# Patient Record
Sex: MALE | Race: WHITE | NOT HISPANIC OR LATINO | Employment: UNEMPLOYED | URBAN - METROPOLITAN AREA
[De-identification: names, ages, dates, MRNs, and addresses within clinical notes are randomized per-mention and may not be internally consistent; named-entity substitution may affect disease eponyms.]

---

## 2023-01-01 ENCOUNTER — HOSPITAL ENCOUNTER (EMERGENCY)
Facility: HOSPITAL | Age: 23
Discharge: HOME | End: 2023-01-01
Attending: EMERGENCY MEDICINE | Admitting: EMERGENCY MEDICINE
Payer: COMMERCIAL

## 2023-01-01 VITALS
HEART RATE: 101 BPM | OXYGEN SATURATION: 97 % | SYSTOLIC BLOOD PRESSURE: 138 MMHG | HEIGHT: 69 IN | BODY MASS INDEX: 29.62 KG/M2 | DIASTOLIC BLOOD PRESSURE: 80 MMHG | TEMPERATURE: 98.7 F | RESPIRATION RATE: 19 BRPM | WEIGHT: 200 LBS

## 2023-01-01 DIAGNOSIS — Z02.83 ENCOUNTER FOR BLOOD-DRUG TEST: ICD-10-CM

## 2023-01-01 PROBLEM — R41.0: Status: ACTIVE | Noted: 2019-04-15

## 2023-01-01 PROBLEM — T43.205A: Status: ACTIVE | Noted: 2019-04-15

## 2023-01-01 PROBLEM — F41.9 ANXIETY: Status: ACTIVE | Noted: 2017-01-18

## 2023-01-01 PROBLEM — H66.90 OTITIS MEDIA: Status: ACTIVE | Noted: 2023-01-01

## 2023-01-01 PROBLEM — F29 PSYCHOSIS (H): Status: ACTIVE | Noted: 2023-01-01

## 2023-01-01 PROBLEM — D64.9 ANEMIA, UNSPECIFIED: Status: ACTIVE | Noted: 2023-01-01

## 2023-01-01 PROBLEM — F19.921: Status: ACTIVE | Noted: 2019-04-12

## 2023-01-01 PROBLEM — E87.6 HYPOKALEMIA: Status: ACTIVE | Noted: 2023-01-01

## 2023-01-01 LAB
AMPHETAMINES UR QL SCN: NORMAL
BARBITURATES UR QL SCN: NORMAL
BENZODIAZ UR QL SCN: NORMAL
BZE UR QL SCN: NORMAL
CANNABINOIDS UR QL SCN: NORMAL
OPIATES UR QL SCN: NORMAL
PCP QUAL URINE (ROCHE): NORMAL

## 2023-01-01 PROCEDURE — 80307 DRUG TEST PRSMV CHEM ANLYZR: CPT | Performed by: EMERGENCY MEDICINE

## 2023-01-01 PROCEDURE — 99283 EMERGENCY DEPT VISIT LOW MDM: CPT

## 2023-01-01 ASSESSMENT — ACTIVITIES OF DAILY LIVING (ADL): ADLS_ACUITY_SCORE: 35

## 2023-01-01 NOTE — ED TRIAGE NOTES
patient states that his [facility] house did drug testing and he came up positive for oxycodone. Patient states he absolutely did not take oxycodone and wants a repeat test.

## 2023-01-01 NOTE — ED PROVIDER NOTES
EMERGENCY DEPARTMENT ENCOUNTER            IMPRESSION:  Evaluation for drug testing -no opiates identified on drug screen      MEDICAL DECISION MAKING:  Patient referred from [facility] for drug testing.  He reports to positive drug test that the [facility].  He denies substance abuse    On exam he is awake and alert.  Slightly tachycardic.    Urinalysis is negative for any recreational substances    Patient given copy of the report and discharged home      =================================================================  CHIEF COMPLAINT:  Chief Complaint   Patient presents with     Drug / Alcohol Assessment         HPI  Ángel Burgos is a 22 year old male with a history of psychosis, substance abuse, depression, drug intoxication with delirium, suicidal ideation, ADHD, headache, anemia, and serotonin withdrawal syndrome who presents to the ED by walk in escorted by father for evaluation of drug assessment.    Per patient, he is currently living in a sober house in [place]. Today, they did a urine drug test, and his came back positive for oxycodone. They repeated the test, and it came back positive again. Patient states he absolutely did no take oxycodone and wants a repeat test.    Per patient's dad, he notes that the patient had some dental fillings done earlier in the week and wonders if that could be the cause of the positive result.    Patient states he was sent to the ED to get another urine test.    Patient is otherwise healthy and denies any other complaints at this time.    I, Thuy Larson am serving as a scribe to document services personally performed by Dr. Karan Rutledge MD, based on my observation and the provider's statements to me. I, Dr. Karan Rutledge MD attest that Thuy Larson is acting in a scribe capacity, has observed my performance of the services and has documented them in accordance with my direction.      REVIEW OF SYSTEMS   Denies drug use      PAST MEDICAL  "HISTORY:  No past medical history on file.    PAST SURGICAL HISTORY:  No past surgical history on file.      CURRENT MEDICATIONS:    amphetamine-dextroamphetamine (ADDERALL XR) 25 MG 24 hr capsule  SUMAtriptan (IMITREX) 25 MG tablet        ALLERGIES:  No Known Allergies    FAMILY HISTORY:  No family history on file.    SOCIAL HISTORY:   Social History     Socioeconomic History     Marital status: Single   Tobacco Use     Smoking status: Never   Substance and Sexual Activity     Alcohol use: No     Drug use: No     Sexual activity: Never       PHYSICAL EXAM:    /80   Pulse 101   Temp 98.7  F (37.1  C)   Resp 19   Ht 1.753 m (5' 9\")   Wt 90.7 kg (200 lb)   SpO2 97%   BMI 29.53 kg/m      Constitutional: Awake, alert, in no acute distress  Psychiatric: Normal mood and affect. Normal judgement.    ED COURSE:    5:45 PM I introduced myself to the patient, obtained patient history, performed a physical exam, and discussed plan for ED workup including potential diagnostic laboratory/imaging studies and interventions.    7:28 PM Rechecked and updated the patient. Drug test negative for oxycodone. We discussed the plan for discharge and the patient is agreeable. Reviewed supportive cares, symptomatic treatment, outpatient follow up, and reasons to return to the Emergency Department. Patient to be discharged by ED RN.         Medical Decision Making    History:    Supplemental history from: Documented in HPI, if applicable    External Record(s) reviewed: Documented in [facility], if applicable.    Work Up:    Chart documentation includes differential considered and any EKGs or imaging independently interpreted by provider.    In additional to work up documented, I considered the following work up:     External consultation:    Discussion of management with another provider:     Complicating factors:    Care impacted by chronic illness:     Care affected by social determinants of health:     Disposition considerations:    "       LAB:  Laboratory results were independently reviewed and interpreted  Results for orders placed or performed during the hospital encounter of 01/01/23   Drug abuse screen 77 urine (FL, RH, SH)   Result Value Ref Range    Amphetamines Urine Screen Negative Screen Negative    Barbituates Urine Screen Negative Screen Negative    Benzodiazepine Urine Screen Negative Screen Negative    Cannabinoids Urine Screen Negative Screen Negative    Opiates Urine Screen Negative Screen Negative    PCP Urine Screen Negative Screen Negative    Cocaine Urine Screen Negative Screen Negative             NEW PRESCRIPTIONS STARTED AT TODAY'S ER VISIT:  New Prescriptions    No medications on file          FINAL DIAGNOSIS:    ICD-10-CM    1. Encounter for blood-drug test  Z02.83                At the conclusion of the encounter I discussed the results of all of the tests and the disposition. The questions were answered. The patient or family acknowledged understanding and was agreeable with the care plan.     NAME: Ángel Burgos  AGE: 22 year old male  YOB: 2000  MRN: [id number]  EVALUATION DATE & TIME: 1/1/2023  5:14 PM    PCP: Radha Juares    ED PROVIDER: Karan Rutledge M.D.      I, Thuy Larson, am serving as a scribe to document services personally performed by Dr. Karan Rutledge based on my observation and the provider's statements to me. I, Karan Rutledge MD attest that Thuy Larson is acting in a scribe capacity, has observed my performance of the services and has documented them in accordance with my direction.    Karan Rutledge M.D.  Emergency Medicine  [facility] EMERGENCY DEPARTMENT  [facility] [facility] [address]  [phone]  Dept: [phone]  1/1/2023       Karan Rutledge MD  01/01/23 [id number]

## 2023-02-15 ENCOUNTER — OFFICE VISIT (OUTPATIENT)
Dept: SLEEP MEDICINE | Facility: CLINIC | Age: 23
End: 2023-02-15
Payer: COMMERCIAL

## 2023-02-15 VITALS
HEIGHT: 69 IN | WEIGHT: 216 LBS | DIASTOLIC BLOOD PRESSURE: 85 MMHG | RESPIRATION RATE: 16 BRPM | SYSTOLIC BLOOD PRESSURE: 135 MMHG | BODY MASS INDEX: 31.99 KG/M2 | OXYGEN SATURATION: 96 % | HEART RATE: 102 BPM

## 2023-02-15 DIAGNOSIS — R06.83 PRIMARY SNORING: ICD-10-CM

## 2023-02-15 DIAGNOSIS — F51.05 INSOMNIA DUE TO OTHER MENTAL DISORDER: Primary | ICD-10-CM

## 2023-02-15 DIAGNOSIS — F99 INSOMNIA DUE TO OTHER MENTAL DISORDER: Primary | ICD-10-CM

## 2023-02-15 PROCEDURE — 99204 OFFICE O/P NEW MOD 45 MIN: CPT | Performed by: STUDENT IN AN ORGANIZED HEALTH CARE EDUCATION/TRAINING PROGRAM

## 2023-02-15 RX ORDER — OLANZAPINE 10 MG/1
10 TABLET ORAL
COMMUNITY
Start: 2023-02-08

## 2023-02-15 ASSESSMENT — SLEEP AND FATIGUE QUESTIONNAIRES
HOW LIKELY ARE YOU TO NOD OFF OR FALL ASLEEP WHILE SITTING INACTIVE IN A PUBLIC PLACE: SLIGHT CHANCE OF DOZING
HOW LIKELY ARE YOU TO NOD OFF OR FALL ASLEEP WHILE SITTING QUIETLY AFTER LUNCH WITHOUT ALCOHOL: SLIGHT CHANCE OF DOZING
HOW LIKELY ARE YOU TO NOD OFF OR FALL ASLEEP WHILE SITTING AND READING: SLIGHT CHANCE OF DOZING
HOW LIKELY ARE YOU TO NOD OFF OR FALL ASLEEP WHILE SITTING AND TALKING TO SOMEONE: WOULD NEVER DOZE
HOW LIKELY ARE YOU TO NOD OFF OR FALL ASLEEP IN A CAR, WHILE STOPPED FOR A FEW MINUTES IN TRAFFIC: WOULD NEVER DOZE
HOW LIKELY ARE YOU TO NOD OFF OR FALL ASLEEP WHILE WATCHING TV: SLIGHT CHANCE OF DOZING
HOW LIKELY ARE YOU TO NOD OFF OR FALL ASLEEP WHEN YOU ARE A PASSENGER IN A CAR FOR AN HOUR WITHOUT A BREAK: WOULD NEVER DOZE
HOW LIKELY ARE YOU TO NOD OFF OR FALL ASLEEP WHILE LYING DOWN TO REST IN THE AFTERNOON WHEN CIRCUMSTANCES PERMIT: SLIGHT CHANCE OF DOZING

## 2023-02-15 NOTE — PATIENT INSTRUCTIONS
"MY INFORMATION ON SLEEP APNEA-  Ángel Burgos    DOCTOR : Bassam Jerez MD  SLEEP CENTER :      MY CONTACT NUMBER:    [facility]  [phone]  [facility]      Key Points:  1. What is Obstructive Sleep Apnea (ALICE)? ALICE is the most common type of sleep apnea. Apnea literally means, \"without breath.\" It is characterized by repetitive pauses in breathing, despite continued effort to breathe, and is usually associated with a reduction in blood oxygen saturation. Apneas can last 10 to over 60 seconds. It is caused by narrowing or collapse of the upper airway as muscles relax during sleep.       2. What are the consequences of ALICE? Symptoms include: daytime sleepiness- possibly increasing the risk of falling asleep while driving, unrefreshing/restless sleep, snoring, insomnia, waking frequently to urinate, waking with heartburn or reflux, reduced concentration and memory, and morning headaches. Other health consequences may include development of high blood pressure. Untreated ALICE also can contribute to heart disease, stroke and diabetes.   3. What are the treatment options? In most situations, sleep apnea is a lifelong disease that must be managed with daily therapy. Continuous Positive Airway (CPAP) is the most reliable treatment. A mouthguard to hold your jaw forward is usually the next most reliable option. Other options include postioning devices (to keep you off your back), nasal valves, tongue retaining device, weight loss, surgery. There is more detail about these options toward the end of this document.  4. What are the most important things to remember about using CPAP?     WHERE CAN I FIND MORE INFORMATION?    American Academy of Sleep Medicine Patient information on sleep disorders:  http://yoursleep.aasmnet.org    CPAP -  WHY AND HOW?                 Continuous positive airway pressure, or CPAP, is the most effective treatment for obstructive sleep " "apnea. It works by blowing room air, through a mask, to hold your throat open. A decision to use CPAP is a major step forward in the pursuit of a healthier life. The successful use of CPAP will help you breathe easier, sleep better and live healthier. Using CPAP can be a positive experience if you keep these bartlett points in mind:  Commitment  CPAP is not a quick fix for your problem. It involves a long-term commitment to improve your sleep and your health.    Communication  Stay in close communication with both your sleep doctor and your CPAP supplier. Ask lots of questions and seek help when you need it.    Consistency  Use CPAP all night, every night and for every nap. You will receive the maximum health benefits from CPAP when you use it every time that you sleep. This will also make it easier for your body to adjust to the treatment.    Correction  The first machine and mask that you try may not be the best ones for you. Work with your sleep doctor and your CPAP supplier to make corrections to your equipment selection. Ask about trying a different type of machine or mask if you have ongoing problems. Make sure that your mask is a good fit and learn to use your equipment properly.    Challenge  Tell a family member or close friend to ask you each morning if you used your CPAP the previous night. Have someone to challenge you to give it your best effort.    Connection   Your adjustment to CPAP will be easier if you are able to connect with others who use the same treatment. Ask your sleep doctor if there is a support group in your area for people who have sleep apnea, or look for one on the Internet.  Comfort   Increase your level of comfort by using a saline spray, decongestant or heated humidifier if CPAP irritates your nose, mouth or throat. Use your unit's \"ramp\" setting to slowly get used to the air pressure level. There may be soft pads you can buy that will fit over your mask straps. Look on www.CPAP.com for " accessories that can help make CPAP use more comfortable.  Cleaning   Clean your mask, tubing and headgear on a regular basis. Put this time in your schedule so that you don't forget to do it. Check and replace the filters for your CPAP unit and humidifier.    Completion   Although you are never finished with CPAP therapy, you should reward yourself by celebrating the completion of your first month of treatment. Expect this first month to be your hardest period of adjustment. It will involve some trial and error as you find the machine, mask and pressure settings that are right for you.    Continuation  After your first month of treatment, continue to make a daily commitment to use your CPAP all night, every night and for every nap.    CPAP-Tips to starting with success:  Begin using your CPAP for short periods of time during the day while you watch TV or read.    Use CPAP every night and for every nap. Using it less often reduces the health benefits and makes it harder for your body to get used to it.    Newer CPAP models are virtually silent; however, if you find the sound of your CPAP machine to be bothersome, place the unit under your bed to dampen the sound.     Make small adjustments to your mask, tubing, straps and headgear until you get the right fit. Tightening the mask may actually worsen the leak.  If it leaves significant marks on your face or irritates the bridge of your nose, it may not be the best mask for you.  Speak with the person who supplied the mask and consider trying other masks. Insurances will allow you to try different masks during the first month of starting CPAP.  Insurance also covers a new mask, hose and filter about every 6 months.    Use a saline nasal spray to ease mild nasal congestion. Neti-Pot or saline nasal rinses may also help. Nasal gel sprays can help reduce nasal dryness.  Biotene mouthwash can be helpful to protect your teeth if you experience frequent dry mouth.  Dry mouth  may be a sign of air escaping out of your mouth or out of the mask in the case of a full face mask.  Speak with your provider if you expect that is the case.     Take a nasal decongestant to relieve more severe nasal or sinus congestion.  Do not use Afrin (oxymetazoline) nasal spray more than 3 days in a row.  Speak with your sleep doctor if your nasal congestion is chronic.    Use a heated humidifier that fits your CPAP model to enhance your breathing comfort. Adjust the heat setting up if you get a dry nose or throat, down if you get condensation in the hose or mask.  Position the CPAP lower than you so that any condensation in the hose drains back into the machine rather than towards the mask.    Try a system that uses nasal pillows if traditional masks give you problems.    Clean your mask, tubing and headgear once a week. Make sure the equipment dries fully.    Regularly check and replace the filters for your CPAP unit and humidifier.    Work closely with your sleep provider and your CPAP supplier to make sure that you have the machine, mask and air pressure setting that works best for you. It is better to stop using it and call your provider to solve problems than to lay awake all night frustrated with the device.    BESIDES CPAP, WHAT OTHER THERAPIES ARE THERE?  Postioning devices if you only have the snoring or apnea while on your back  Dental devices if your condition is mild  Nasal valves may be effective though experience is limited  Weight loss if you are overweight  Surgery in limited cases where devices are not acceptable or there are problems with structures in the nose and throat  If treated with one of these alternative options, further evaluation is necessary to ensure that the therapy is effective. This may require some form of repeat testing.      Healthy Lifestyle:  Healthy diet, exercise and limit alcohol: Not only will excessive alcohol increase your weight over time, but it irritates the  throat tissues and make them swell, shrinking the airway and causing snoring. Drinking alcohol should be limited and stopped within 3-4 hours before going to bed.   Stop smoking: (Red swollen throat, heat, nicotine), also irritates and swells the airway, among numerous other negative health consequences.    Positioning Device  This example shows a pillow that straps around the waist. It may be appropriate for those whose sleep study shows milder sleep apnea that occurs primarily when lying flat on one's back. Preliminary studies have shown benefit but effectiveness at home should be verified.                      Oral Appliance  These are examples of two of many custom-made devices that are more likely to work in mild sleep apnea   Oral appliances are dental mouth pieces that fit very much like a sports mouth guards or removable orthodontic retainers. They are used to treat snoring and obstructive sleep apnea . The device prevents the airway from collapsing by either supporting the jaw in a forward position. Since oral appliances are non-invasive and easy to use, they may be considered as an early treatment option. Oral appliance therapy (OAT) involves the customization, selection, fabrication, fitting, adjustments and long-term follow-up care.  Custom made oral appliances are proven to be more effective than over-the-counter devices. Therefore, the over-the-counter devices are recommended not to be used as a screening tool nor as a therapeutic option.     Who gets a dental device?  Oral appliance therapy can be used as an alternative to CPAP therapy for the treatment of mild to moderate sleep apnea and for those patients who prefer OAT to CPAP. Oral appliance therapy is a first line therapy for the treatment of primary snoring. Additionally, OAT is an option for those that cannot tolerate CPAP as therapy or who have experienced insufficient surgical results.                 Possible side effects?  Frequent but  minor side effects include: excessive salivation, dry mouth, discomfort of teeth and jaw and temporary changes in the patient s bite.  Potential complications include: jaw pain, permanent occlusal changes and TMJ symptoms.  The above mentioned side effects and complications can be recognized and managed by dentists trained in dental sleep medicine.   Finding a dentist that practices dental sleep medicine  Specific training is available through the American Academy of Dental Sleep Medicine for dentists interested in working in the field of sleep. To find a dentist who is educated in the field of sleep and the use of oral appliances, near you, visit the Web site of the American Academy of Dental Sleep Medicine; also see http://www.accpstorage.org/newOrganization/patients/oralAppliances.pdf   To search for a dentist certified in these practices:  Http://aadsm.org/FindADentist.aspx?1  Nasal Valves              Nasal valves may be an option for mild apnea if other options are not well tolerated. The efficacy of these devices is generally less than CPAP or oral appliances.They may not be effective if you have frequent nasal congestion or have difficulty breathing through your nose.   Weight Loss:    Weight loss decreases severity of sleep apnea in most people with obesity. For those with mild obesity who have developed snoring with weight gain, even 15-30 pound weight loss can improve and occasionally eliminate sleep apnea.  Structured and life-long dietary and health habits are necessary to lose weight and keep healthier weight levels.     Though there are significant health benefits from weight loss, long-term weight loss is very difficult to achieve- studies show success with dietary management in less than 10% of people. In addition, substantial weight loss may require years of dietary control and may be difficult if patients have severe obesity. In these cases, surgical management may be considered.    If you are  interested in methods for weight loss, you should review the options discussed at the National Institutes of Health patient information sites:     http://win.niddk.nih.gov/publications/index.htm  http:/www.health.nih.gov/topic/WeightLossDieting    Bariatric programs offer counseling in all methods of weight loss:    Http:/[email or web address]/htm    Your BMI is Body mass index is 31.9 kg/m .        Body mass index (BMI) is one way to tell whether you are at a healthy weight, overweight, or obese. It measures your weight in relation to your height.  A BMI of 18.5 to 24.9 is in the healthy range. A person with a BMI of 25 to 29.9 is considered overweight, and someone with a BMI of 30 or greater is considered obese.  Another way to find out if you are at risk for health problems caused by overweight and obesity is to measure your waist. If you are a woman and your waist is more than 35 inches, or if you are a man and your waist is more than 40 inches, your risk of disease may be higher.  More than two-thirds of American adults are considered overweight or obese. Being overweight or obese increases the risk for further weight gain.  Excess weight may lead to heart disease and diabetes. Creating and following plans for healthy eating and physical activity may help you improve your health.    Methods for maintaining or losing weight.    Weight control is part of healthy lifestyle and includes exercise, emotional health, and healthy eating habits.  Careful eating habits lifelong is the mainstay of weight control.  Though there are significant health benefits from weight loss, long-term weight loss with diet alone may be very difficult to achieve- studies show long-term success with dietary management in less than 10% of people. Attaining a healthy weight may be especially difficult to achieve in those with severe obesity. In some cases, medications, devices and surgical  "management might be considered.    What can you do?    If you are overweight or obese and are interested in methods for weight loss, you should discuss this with your provider. In addition, we recommend that you review healthy life styles and methods for weight loss available through the National Institutes of Health patient information sites:     http://win.niddk.nih.gov/publications/index.htm      Surgery:  There are a number of surgeries that have been attempted to treat apnea. In general, surgical options are usually reserved for cases in which there is a physical abnormality contributing to obstruction or other treatment options are ineffective or not tolerated. Most surgical options are either unreliable or quite invasive. One of the more common procedures is:  Uvulopalatopharyngoplasty: In this procedure, the uvula (the finger-like tissue that hangs in the back of the throat), part of the soft palate (the tissue that the uvula is attached to), and sometimes the tonsils or adenoids are removed. The efficacy of this surgery is around 30-50% .  After surgery, complications may include:  Sleepiness and sleep apnea related to post-surgery medication   Swelling, infection and bleeding   A sore throat and/or difficulty swallowing   Drainage of secretions into the nose and a nasal quality to the voice. English language speech does not seem to be affected by this surgery.   Narrowing of the airway in the nose and throat (hence constricting breathing) snoring and even iatrogenically caused sleep apnea. By cutting the tissues, excess scar tissue can \"tighten\" the airway and make it even smaller than it was before UPPP.  Patients who have had the uvula removed will become unable to correctly speak Sami or any other language that has a uvular 'r' phoneme.  Surgeries to help resolve nasal congestion may help reduce the severity of apnea slightly. Nasal congestion does not cause apnea on its own, so these surgeries are " usually not performed just for ALICE.  They may be worth considering if the nasal congestion is significantly bothersome independent of apnea.           CBT-I Introductory Module    Understanding Sleep and Insomnia    Most people have trouble sleeping at some point in their life.   Chronic insomnia means you have had trouble falling asleep and/or staying asleep for at least the past three months.  Despite allowing enough time for sleep, it is affecting how you feel. You are not alone.  It is estimated that 10-15% of adults experience chronic insomnia.     Cognitive Behavioral Therapy for Insomnia (CBT-I)    Consistent with the guidelines of the American College of Physicians, [facility] recommends  Cognitive Behavioral Therapy for Insomnia (CBT-I) as the first-line treatment for insomnia.  CBT-I targets factors that lead to long-term insomnia:    Behavioral Conditioning    When you lay awake in bed over many nights, your body actually becomes trained or 'conditioned' to be awake during the night.  It makes the bed associated with alertness instead of sleepiness.    Habits that weaken your body's sleep drive and circadian sleep rhythm    Changing sleep schedules, extended time in bed, using mobile devices and computers close to bedtime, and naps too late in the day can harm your sleep at night.    Emotional and Physical Arousal    Things such as worrying about sleep, stress, drinking too much caffeine, and not winding down before bed can interfere with your body's natural sleep drive.    Understanding Sleep and Insomnia    [facility] CBT-I is a four-part program that teaches you the skills needed for a better night's sleep. The first step in your program is learning a bit about sleep and insomnia.      The Basics of Sleep    How does sleep help us?    Sleep, like food and water, is something we need every day. The purpose of sleep is still not exactly clear, but sleep experts agree we need consistent  quality sleep to function at our best. There is evidence that sleep helps maintain brain and body functions.  It helps maintain thinking ability and mood.  Sleep is actually a very active part of life. Sleep occurs in four stages that cycle every  minutes throughout the night. We get our deepest sleep during the first few hours of sleep.  During the last half of our sleep, we usually get the bulk of our REM (Rapid Eye Movement) sleep.  REM sleep is when most of our dreaming occurs.    How much sleep do we need?    Sleep needs vary from person to person. Most adults need between 6-8 hours of sleep.  Sleeping too little or too much may be a health risk.  As we age, most people report their sleep gets lighter, earlier, shorter, and more restless.     What Controls Our Sleep?    The three things that regulate your sleep are your sleep drive, biological clock and your arousal system (emotional and physical).  Together these make us feel alert during the day and promote sleep at night.    Your sleep drive depends on how long you have been awake. It is lowest when you first wake up.  Sleep drive gradually increases as the day goes on.  The longer time you are awake the easier it is to fall asleep.  Sleeping gradually reduces your sleep drive. That is why napping in the evening or close to bed can make it harder to sleep at night.    Your biological clock promotes wakefulness during the day and sleep at night.          From Telma et al. (2009). Evaluation and Management of Insomnia in the Psychiatric setting. Published Online: 19/1/2009; DOI: https://doi.[email or web address].[email or web address]      Understanding Insomnia    What causes insomnia?    Some people have a greater predisposition to developing insomnia due to genetics, personality or age. A host of things can trigger or precipitate it: jet lag, working a different shift, medication, or the onset of a medical or mental health condition.         Insomnia and Your  Arousal System    Mental activity, emotions and physical symptoms can make your brain too active to sleep by masking the strength of your sleep drive. Your brain's arousal system not only triggers insomnia, it plays a role in maintaining it as well.  Common sources of arousal include:    Worry about sleep in bed  An active mind concerned about unfinished tasks  Anxiety, Stress and Depression  Pain     What perpetuates insomnia?    Short-term insomnia can become chronic when you begin to feel fearful, worried and  on guard  about sleep loss. As you spend more time in bed or try forcing yourself to sleep your bed becomes linked with wakefulness.  This is why people with insomnia commonly report feeling tired before bed but suddenly more alert when getting into bed.  This type of  conditioning  along with unhelpful sleep habits maintains the insomnia even when the triggering event has resolved.    Tracking your Sleep    Sleep tracking is an essential part of training yourself to sleep better and monitoring your progress.  There are several ways to keep track of your sleep habits.     Insomnia  Roberta    The Insomnia  roberta is a convenient way to keep track of your sleep prior to and during treatment.  Simply download the free roberta on your Apple or Android phone and record your information each morning.   The data you enter should be your recollection of the past nigh of sleep. Do not watch or monitor the clock in the middle of the night while keeping your sleep diary.     The roberta also includes training and sleep schedule recommendations.  We recommend you use only the tracking function unless instructed by your provider. You can email your data to yourself prior to your visit by using the [place] User Data function found in the Settings Section.  It is important that you have your data available to review with your provider at the time of your visit.          Another roberta you can try is the CBT-I  Roberta       M  [facility] Sleep Diary    You can also track your sleep using the [facility] paper sleep diary.  You can upload your sleep diary and send it via a [employer] message  or have it with you at the time of your visit.        Mobile and Wearable Sleep Tracking Devices    There are many sleep tracking devices and mobile applications that estimate the timing and quantity of sleep by measuring body movement.  Though many of these devices claim to measure the depth or stages of sleep, they cannot measure brain wave activity or other indicators required to determine the actual stages of sleep.  They are helpful in estimating the timing and total amount of time you sleep.    CBT-I and Sleeping Pills    Sleep medications can be helpful in the short-term but often stop working in the long-term.  Sleeping pills treat symptoms and not the underlying cause of insomnia.  They also can have side effects that last well into the day. Abruptly stopping sleeping pills can cause temporary rebound insomnia and lead to increased distress about sleeplessness.  This in turn strengthens the belief that pills are necessary for sleep.    Many patients choose to discontinue sleeping pills prior to beginning CBT-I.  You should talk to your prescribing provider before tapering or discontinuing sleep medication.      work on a pre-bedtime ritual,   refrain from clock watching,   and use a thought stopping technique.   get out of bed if unable to initiate sleep within 20-30 minutes at any time of the night   leave the bedroom   engage in a relaxing/distracting activity, such as reading, watching TV, listening to music, crossword puzzles, etc.    go back to bed ONLY when drowsy  Repeat these steps as necessary throughout the night but keep the same wake up time regardless of how much sleep you obtained.

## 2023-02-15 NOTE — ASSESSMENT & PLAN NOTE
STOP BANG score is 3/8. Patient is at an intermediate risk for sleep apnea based on clinical history of snoring, neck circumference, and gender.   Obstructive sleep apnea reviewed. Complications of Untreated Sleep Apnea Reviewed.  HST/ Polysomnography reviewed. Information provided regarding treatment options for ALICE.    Do not recommend further evaluation for obstructive sleep apnea at this time

## 2023-02-15 NOTE — PROGRESS NOTES
[facility]  Consultation Note    Name: Ángel Burgos MRN#: [id number]   Age: 22 year old YOB: 2000     Date of Consultation: 02/15/23  Consultation is requested by: No ref. provider found  Primary care provider: LE POWELL    History of Present Illness:   Ángel Burgos is a 22 year old male patient with MDD, possible bipolar disorder, JACKIE, ADHD on Adderall, migraines, and substance use disorder in remission   He is sent by No ref. provider found for a sleep consultation regarding Consult (Discuss sleep apnea)    Ángel Burgos main reason for visit: I was told I had to  Patient states problem(s) started: two years ago  Ángel Burgos's goals for this visit: to find out more information    He has not had a previous sleep study.    CPAP: No    Occasional difficulty falling asleep due to intrusive thoughts, active mind, and difficulty relaxing. Believes that his symptoms worsened when he had medication non-adherence to his Zyprexa     SLEEP-WAKE SCHEDULE:   Work/School Days: Patient goes to school/work: No   Usually gets into bed at 800  Takes patient about not long to fall asleep  Has trouble falling asleep two or three nights nights per week  Wakes up in the middle of the night not often times.  Wakes up due to Uncertain  He has trouble falling back asleep two or three times a week.   It usually takes not long to get back to sleep  Patient is usually up at 7  Uses alarm: Yes    Weekends/Non-work Days/All Other Days:  Usually gets into bed at 7   Takes patient about twenty minutes to fall asleep  Patient is usually up at twenty minutes  Uses alarm: Yes    Sleep Need  Patient gets  8 hours sleep on average   Patient thinks He needs about 8 hours sleep    Ángel Burgos prefers to sleep in this position(s): Back   Patient states they do the following activities in bed: Use phone, computer, or tablet    Naps  Patient takes a purposeful nap 0 times a  week and naps are usually 1 hour in duration  He feels better after a nap: Yes  He dozes off unintentionally 0 days per week  Patient has had a driving accident or near-miss due to sleepiness/drowsiness: No      SLEEP DISRUPTIONS:  Breathing/Snoring  Patient snores:Yes  Other people complain about His snoring: Yes  Patient has been told He stops breathing in His sleep:No  He has issues with the following: Morning headaches;Stuffy nose when you wake up    Movement:  Patient gets pain, discomfort, with an urge to move:  No  It happens when He is resting:  No  It happens more at night:  No  Patient has been told Ángel Burgos kicks His legs at night:  No     Behaviors in Sleep:  Ángel Burgos has experienced the following behaviors while sleeping:    He has experienced sudden muscle weakness during the day: No     Is there anything else you would like your sleep provider to know: no    CAFFEINE AND OTHER SUBSTANCES:  Patient consumes caffeinated beverages per day:  2  Last caffeine use is usually: 4  List of any prescribed or over the counter stimulants that patient takes: excedrin for migraines  List of any prescribed or over the counter sleep medication patient takes: zyprexa  List of previous sleep medications that patient has tried: clonodine  Patient drinks alcohol to help them sleep: No  Patient drinks alcohol near bedtime: No    Family History:  Patient has a family member been diagnosed with a sleep disorder: No        SCALES:  EPWORTH SLEEPINESS SCALE    [place] Sleepiness Scale ( JEREMY Wang  [id number]<br>ESS - [place]/English - Final version - 21 Nov 07 - [facility] Research [place].) 2/15/2023   Sitting and reading Slight chance of dozing   Watching TV Slight chance of dozing   Sitting, inactive in a public place (e.g. a theatre or a meeting) Slight chance of dozing   As a passenger in a car for an hour without a break Would never doze   Lying down to rest in the afternoon when circumstances permit  Slight chance of dozing   Sitting and talking to someone Would never doze   Sitting quietly after a lunch without alcohol Slight chance of dozing   In a car, while stopped for a few minutes in traffic Would never doze   [place] Score (MC) 5   [place] Score (Sleep) 5       INSOMNIA SEVERITY INDEX (JAKI)    Insomnia Severity Index (JAKI) 2/15/2023   Difficulty falling asleep 1   Difficulty staying asleep 1   Problems waking up too early 0   How SATISFIED/DISSATISFIED are you with your CURRENT sleep pattern? 1   How NOTICEABLE to others do you think your sleep problem is in terms of impairing the quality of your life? 1   How WORRIED/DISTRESSED are you about your current sleep problem? 0   To what extent do you consider your sleep problem to INTERFERE with your daily functioning (e.g. daytime fatigue, mood, ability to function at work/daily chores, concentration, memory, mood, etc.) CURRENTLY? 1   JAKI Total Score 5   Guidelines for Scoring/Interpretation:  Total score categories:  0-7 = No clinically significant insomnia   8-14 = Subthreshold insomnia   15-21 = Clinical insomnia (moderate severity)  22-28 = Clinical insomnia (severe)  Used via courtesy of www.Wallstr.va.gov with permission from Abner Castillo PhD., [facility]      STOP BANG   ALICE Medium Risk            Total Score: 3    ALICE: Snores loudly    ALICE: Neck Circum >16 in    ALICE: Male        GAD7  JACKIE-7  9/21/2016   1. Feeling nervous, anxious, or on edge 2   2. Not being able to stop or control worrying 0   3. Worrying too much about different things 1   4. Trouble relaxing 1   5. Being so restless that it is hard to sit still 2   6. Becoming easily annoyed or irritable 1   7. Feeling afraid, as if something awful might happen 1   JACKIE-7 Total Score 8   If you checked any problems, how difficult have they made it for you to do your work, take care of things at home, or get along with other people? -         CAGE-AID  No flowsheet data  "found.    CAGE-AID reprinted with permission from the [place] Medical Journal, WALLY [place]. and YULIYA Flanagan, \"Conjoint screening questionnaires for alcohol and drug abuse\" [place] Medical Journal 94: 135-140, 1995.      PATIENT HEALTH QUESTIONNAIRE-9 (PHQ - 9)  PHQ-9 ([employer]) 9/21/2016   1.  Little interest or pleasure in doing things 1   2.  Feeling down, depressed, or hopeless 1   3.  Trouble falling or staying asleep, or sleeping too much 3   4.  Feeling tired or having little energy 1   5.  Poor appetite or overeating 2   6.  Feeling bad about yourself 1   7.  Trouble concentrating 3   8.  Moving slowly or restless 2   9.  Suicidal or self-harm thoughts 0   PHQ-9 Total Score 14   Difficulty at work, home, or with people Somewhat difficult     Developed by Eloise Maier, Ashley Lim, Trevor Perkins and colleagues, with an educational wei from [employer]. No permission required to reproduce, translate, display or distribute.      Allergies:    Allergies   Allergen Reactions     Quetiapine Other (See Comments)     Breathing issues, fast heart rate, & unable to sleep       Medications:    Current Outpatient Medications   Medication Sig Dispense Refill     OLANZapine (ZYPREXA) 10 MG tablet Take 10 mg by mouth         Problem List:  Patient Active Problem List    Diagnosis Date Noted     Insomnia due to other mental disorder 02/15/2023     Priority: Medium     Primary snoring 02/15/2023     Priority: Medium     Anemia, unspecified 01/01/2023     Priority: Medium     Hypokalemia 01/01/2023     Priority: Medium     Otitis media 01/01/2023     Priority: Medium     Psychosis (H) 01/01/2023     Priority: Medium     Formatting of this note might be different from the original.  Inpatient mental health care (5/21).       Serotonin withdrawal syndrome with delirium 04/15/2019     Priority: Medium     Drug intoxication with delirium (H) 04/12/2019     Priority: Medium     Anxiety 01/18/2017     Priority: " Medium     Major depressive disorder, recurrent episode, moderate (H) 12/31/2015     Priority: Medium     ADHD (attention deficit hyperactivity disorder) 12/16/2015     Priority: Medium     Depression 12/05/2013     Priority: Medium     Headache 01/10/2013     Priority: Medium     Formatting of this note might be different from the original.  migranes  ; Headache(784.0)       Intractable migraine 09/16/2011     Priority: Medium        Past Medical/Surgical History:  No past medical history on file.  No past surgical history on file.    Social History:  Social History     Socioeconomic History     Marital status: Single     Spouse name: Not on file     Number of children: Not on file     Years of education: Not on file     Highest education level: Not on file   Occupational History     Not on file   Tobacco Use     Smoking status: Never     Smokeless tobacco: Not on file   Substance and Sexual Activity     Alcohol use: No     Drug use: No     Sexual activity: Never   Other Topics Concern     Not on file   Social History Narrative     Not on file     Social Determinants of Health     Financial Resource Strain: Not on file   Food Insecurity: Not on file   Transportation Needs: Not on file   Physical Activity: Not on file   Stress: Not on file   Social Connections: Not on file   Intimate Partner Violence: Not on file   Housing Stability: Not on file       Family History:  No family history on file.    Review of Systems:   A complete review of systems reviewed by me is negative with the exeption of what has been mentioned in the history of present illness.  In the last TWO WEEKS have you experienced any of the following symptoms?  Fevers: No  Night Sweats: No  Weight Gain: Yes  Pain at Night: No  Double Vision: No  Changes in Vision: No  Difficulty Breathing through Nose: No  Sore Throat in Morning: No  Dry Mouth in the Morning: No  Shortness of Breath Lying Flat: No  Shortness of Breath With Activity: No  Awakening with  "Shortness of Breath: No  Increased Cough: No  Heart Racing at Night: No  Swelling in Feet or Legs: No  Diarrhea at Night: No  Heartburn at Night: No  Urinating More than Once at Night: No  Losing Control of Urine at Night: No  Joint Pains at Night: No  Headaches in Morning: No  Weakness in Arms or Legs: No  Depressed Mood: No  Anxiety: Yes     Physical Exam:   Vitals: /85   Pulse 102   Resp 16   Ht 1.753 m (5' 9\")   Wt 98 kg (216 lb)   SpO2 96%   BMI 31.90 kg/m    BMI= Body mass index is 31.9 kg/m .  Neck Cir (cm): 41 cm  GENERAL: Healthy, alert and no distress  EYES: Eyes grossly normal to inspection.  No discharge or erythema, or obvious scleral/conjunctival abnormalities.  RESP: No audible wheeze, cough, or visible cyanosis.  No visible retractions or increased work of breathing.    SKIN: Visible skin clear. No significant rash, abnormal pigmentation or lesions.  NEURO: Cranial nerves grossly intact.  Mentation and speech appropriate for age.  PSYCH: Mentation appears normal, affect normal/bright, judgement and insight intact, normal speech and appearance well-groomed.         Data: All pertinent previous laboratory data reviewed   No results for input(s): NA, POTASSIUM, CHLORIDE, CO2, ANIONGAP, GLC, BUN, CR, EDMUND in the last 77863 hours.    No results for input(s): WBC, RBC, HGB, HCT, MCV, MCH, MCHC, RDW, PLT in the last 96380 hours.    No results for input(s): PROTTOTAL, ALBUMIN, BILITOTAL, ALKPHOS, AST, ALT, BILIDIRECT in the last 50689 hours.    No results found for: TSH    Amphetamines Urine (no units)   Date Value   01/01/2023 Screen Negative     Barbituates Urine (no units)   Date Value   01/01/2023 Screen Negative     Cannabinoids Urine (no units)   Date Value   01/01/2023 Screen Negative     Cocaine Urine (no units)   Date Value   01/01/2023 Screen Negative     Opiates Urine (no units)   Date Value   01/01/2023 Screen Negative     PCP Urine (no units)   Date Value   01/01/2023 Screen Negative "       No results found for: IRONSAT, [id number], CARMINE    No results found for: PH, PHARTERIAL, PO2, [id number], SAT, PCO2, HCO3, BASEEXCESS, MARIA ELENA, BEB    @LABRCNTIPR(phv:4,pco2v:4,po2v:4,hco3v:4,rory:4,o2per:4)@    Echocardiology: No results found for this or any previous visit (from the past 4320 hour(s)).    Chest x-ray: No results found for this or any previous visit from the past 365 days.      Chest CT: No results found for this or any previous visit from the past 365 days.      PFT: Most Recent [facility]ze Pulmonary Function Testing  No results found     Assessment and Plan:     Problem List Items Addressed This Visit        Respiratory    Primary snoring     STOP BANG score is 3/8. Patient is at an intermediate risk for sleep apnea based on clinical history of snoring, neck circumference, and gender.   Obstructive sleep apnea reviewed. Complications of Untreated Sleep Apnea Reviewed.  HST/ Polysomnography reviewed. Information provided regarding treatment options for ALICE.    Do not recommend further evaluation for obstructive sleep apnea at this time              Behavioral    Insomnia due to other mental disorder - Primary     Insomnia is likely multifactorial and possibly due to mental health conditions, psychophysiological, undiagnosed ALICE, lack of appropriate stimulus control, and/or inadequate sleep hygiene.  Patient believes that initiation insomnia was exacerbated during a period of medication nonadherence.  Since resuming Zyprexa and establishing a more consistent sleep routine he denies any difficulty with falling asleep and staying asleep.  His JAKI today was 5/28.    We discussed stimulus control measures    Encouraged to follow good sleep hygiene/behavioral techniques.    Encouraged patient to continue to work with mental health providers and continue with medication adherence            Patient was strongly advised to avoid driving, operating any heavy machinery or other hazardous situations while drowsy or  sleepy.  Patient was counseled on the importance of driving while alert, to pull over if drowsy, or nap before getting into the vehicle if sleepy.      Plan is for Ángel Burgos to follow up as needed    The above note was dictated using voice recognition software. Although reviewed after completion, some word and grammatical error may remain . Please contact the author for any clarifications.    Total time spent reviewing medical records, history and physical examination, review of previous testing and interpretation as well as documentation on this date, 02/15/23: 45 minutes    Bassam Jerez MD on 2/15/2023   [facility]   [address], [address]   [address] [place] [address]. [place] [address]   Appointments: [phone]    CC: No ref. provider found

## 2023-02-15 NOTE — ASSESSMENT & PLAN NOTE
Insomnia is likely multifactorial and possibly due to mental health conditions, psychophysiological, undiagnosed ALICE, lack of appropriate stimulus control, and/or inadequate sleep hygiene.  Patient believes that initiation insomnia was exacerbated during a period of medication nonadherence.  Since resuming Zyprexa and establishing a more consistent sleep routine he denies any difficulty with falling asleep and staying asleep.  His JAKI today was 5/28.    We discussed stimulus control measures    Encouraged to follow good sleep hygiene/behavioral techniques.    Encouraged patient to continue to work with mental health providers and continue with medication adherence

## 2024-04-07 ENCOUNTER — HEALTH MAINTENANCE LETTER (OUTPATIENT)
Age: 24
End: 2024-04-07

## 2024-05-22 ENCOUNTER — VIRTUAL VISIT (OUTPATIENT)
Dept: PSYCHIATRY | Facility: CLINIC | Age: 24
End: 2024-05-22
Payer: COMMERCIAL

## 2024-05-22 DIAGNOSIS — F29 PSYCHOSIS, UNSPECIFIED PSYCHOSIS TYPE (H): Primary | ICD-10-CM

## 2024-05-22 NOTE — PROGRESS NOTES
"[facility] Clinician Phone Screen  A Part of the [facility] First Episode of Psychosis Program    Patient: Ángel Burgos (2000, 23 year old)     MRN: [id number]  Date:  5/22/24  Clinician: NATALIA Chavez     Length of Actual Contact: Start Time: 3:37; End Time: 3:40    Diagnostic Information:  Briefly, in a few sentences, what would you/the patient like to be seen for?  \"I don't know if I want to do this program. My mom scheduled it and I don't have psychosis. I had it a year ago and it was my roommate that was dealing with psychosis.\" After some information about the program, Dar stated, \"ya I do not want to do this program or this. Sorry, have a nice day.\"     Plan:  Is this patient eligible for a comprehensive assessment with First Episode of Psychosis Services? No    Dar is not interested in the program or the services associated.     NATALIA Chavez   "

## 2024-08-07 ENCOUNTER — VIRTUAL VISIT (OUTPATIENT)
Dept: PSYCHIATRY | Facility: CLINIC | Age: 24
End: 2024-08-07
Payer: COMMERCIAL

## 2024-08-07 DIAGNOSIS — F29 PSYCHOSIS, UNSPECIFIED PSYCHOSIS TYPE (H): Primary | ICD-10-CM

## 2024-08-07 NOTE — PROGRESS NOTES
[facility] Clinician Phone Screen  A Part of the [facility] First Episode of Psychosis Program    Patient: Ángel Burgos (2000, 24 year old)     MRN: [id number]  Date:  8/07/24  Clinician: NATALIA Chavez     Length of Actual Contact: Start Time: 11:07; End Time: 11:35    Use the following script to set-up intentions for this appointment:    You may have heard this when you scheduled this phone call but as a reminder, this 30 minute appointment is used to review a few questions to determine if you would be a candidate for our First Episode Psychosis Programs assessment process. Our assessment process includes 2-3 additional  appointments, spread out over several weeks. Eligibility for enrollment and our treatment recommendations will be discussed after those appointments. By the end of the phone call, I hope to determine if you/Pt is eligible for the full assessment appointment process, which we will schedule at the end of this call. This is not an intake and enrollment is not guaranteed.     We also want to be  transparent that start dates may vary depending on eligibility and program availability.   With knowing this information, do you still want to proceed with this phone screen? Yes    Reviewed limits to confidentiality: Yes    Use the following script to describe limits to confidentiality if meeting with the patient/family:   Before we get started I always like to review confidentiality. All of the information you share will be kept confidential. Only with permission will information be released to anyone outside of the organization except when required by law. Those legal exceptions are if there is clear and imminent danger to you or someone else, if there is a reasonable suspicion that child or elder is being abused, or if there is a court order. Do you have any questions about confidentiality before we get started?    Phone screen completed with:  Dar; Relation to the patient: Self  If we move forward  "with scheduling appointments, who should we coordinate appointments with? Dar, Phone: [phone]  Is it OK to leave a detailed voicemail? Yes  If we move forward with scheduling appointments via video, where would you like the link sent? [email or web address]    Demographics: (Verify the following is correct. If incorrect, edit in Demographics.)  What is patient's phone number: [phone] (home)   Patient's Address?    [address] [place] [address]  Patient's Email Address?  [email or web address]    If caller is not the patient, is the patient aware of the referral? Yes    If age 18 or older, does the adult patient consent to this referral and is the patient willing to attend appointments?  \"Maybe\"    Diagnostic Information:  Briefly, in a few sentences, what would you/the patient like to be seen for?  \"I've had a lot of trauma from when this happened before. I'm looking for help with seeking employment, possibly therapy, and also maybe trying some new medications. I do have problems.\"     Have you/the patient experienced symptoms of psychosis? Yes  If yes, for how long and please describe? \"It was in 2021.\"   In particular, are you/the patient experiencing/have experienced any of the following?   -Changes in thinking (odd ideas, grandiosity, suspiciousness, difficulty concentrating): Yes \"Just not being able to trust people.\"   -Changes in perception (auditory/visual/tactile/olfactory abnormalities): Yes \"Either or (visual or auditory).\"  -Changes in speech (disorganized communication, tangential speech): Yes  -Emotional changes (depression, mood swings, irritability, flat affect): Yes  -Dramatic reduction of overall functioning: Yes \"When I had a job, I would go every day. Eventually I was fired.\"     What mental health diagnosis(es) have you/the patient received in the past?   ADHD  Marijuana induced psychosis  Depression    Family history- \"I think on my mom's side, we've got a bit of dementia " "going on. On my dad's side, everyone's bipolar.\"     In particular, have you/the patient ever been diagnosed with:   -Autism spectrum disorder: No   -Borderline personality disorder: No    Any history of developmental delays?  No    If yes, please describe:     Are any of the following applicable to the patient?   -IQ below 70? No  -Non-verbal due to developmental delays? No  -Living in a group home because of developmental disability? No  -Has a guardian because of a developmental disability? No    Service History:   Are you/the patient taking antipsychotics or have you/the patient taken antipsychotics in the past? Yes            If yes, for how long cumulatively? \"I have taken Zyprexa in the past- I started taking it in 2021 and I still take it as needed.\"     Are you/the patient seeing any mental health providers currently? Yes              If yes, who/where? \"Sometimes I go over to [facility] and they have mental health people there.\"     Specifically, have you/the patient had an ACT team in the past?  No    Have you been enrolled in a first episode psychosis outpatient program before, such as Navigate or Strengths here, [facility] Program at [facility], or at the [facility] [place] in [place]? No    Any current thoughts of harming yourself or others? No    What services are you/the patient interested in receiving in our clinics?   Medication management: Yes   Individual therapy: Yes   Family therapy: Yes   Group therapy: Yes   Work/school support: Yes    Research:  If talking with the patient directly, would you be interested in learning more about research opportunities you may qualify? If so, we can connect you with a team member for more information. Yes      Plan:  Is this patient eligible for a comprehensive assessment with First Episode of Psychosis Services? No    Dar stated he is frustrated at needing to do an assessment to be in a program and only wants to get a job. Dar initially allowed " this writer to talk with his mother, who encouraged him to schedule a DA. However, Dar came back on the phone and mentioned he does not want to do a program and hung up.     ANITA ChavezSW

## 2024-08-12 ENCOUNTER — VIRTUAL VISIT (OUTPATIENT)
Dept: PSYCHIATRY | Facility: CLINIC | Age: 24
End: 2024-08-12
Payer: COMMERCIAL

## 2024-08-12 DIAGNOSIS — F29 PSYCHOSIS, UNSPECIFIED PSYCHOSIS TYPE (H): Primary | ICD-10-CM

## 2024-08-13 NOTE — PROGRESS NOTES
This writer called Dar, as there was uncertainty about continuing in assessment at the end of the previous phone screen. Today, Dar stated he does not want to participate in a program or complete the assessment phase. This writer ensured he had the contact information if wanting to pursue assessment in the future.     This is a non-billable encounter as it was solely for the purposes of outreach and/or care coordination.

## 2025-04-19 ENCOUNTER — HEALTH MAINTENANCE LETTER (OUTPATIENT)
Age: 25
End: 2025-04-19